# Patient Record
Sex: FEMALE | Race: ASIAN | NOT HISPANIC OR LATINO | URBAN - METROPOLITAN AREA
[De-identification: names, ages, dates, MRNs, and addresses within clinical notes are randomized per-mention and may not be internally consistent; named-entity substitution may affect disease eponyms.]

---

## 2024-01-04 ENCOUNTER — OUTPATIENT (OUTPATIENT)
Dept: OUTPATIENT SERVICES | Facility: HOSPITAL | Age: 39
LOS: 1 days | End: 2024-01-04
Payer: MEDICAID

## 2024-01-04 VITALS
HEART RATE: 79 BPM | SYSTOLIC BLOOD PRESSURE: 107 MMHG | DIASTOLIC BLOOD PRESSURE: 67 MMHG | OXYGEN SATURATION: 99 % | HEIGHT: 62 IN | WEIGHT: 145.51 LBS | RESPIRATION RATE: 16 BRPM | TEMPERATURE: 98 F

## 2024-01-04 DIAGNOSIS — K75.81 NONALCOHOLIC STEATOHEPATITIS (NASH): ICD-10-CM

## 2024-01-04 DIAGNOSIS — Z01.818 ENCOUNTER FOR OTHER PREPROCEDURAL EXAMINATION: ICD-10-CM

## 2024-01-04 LAB
ALBUMIN SERPL ELPH-MCNC: 4.2 G/DL — SIGNIFICANT CHANGE UP (ref 3.5–5.2)
ALBUMIN SERPL ELPH-MCNC: 4.2 G/DL — SIGNIFICANT CHANGE UP (ref 3.5–5.2)
ALP SERPL-CCNC: 52 U/L — SIGNIFICANT CHANGE UP (ref 30–115)
ALP SERPL-CCNC: 52 U/L — SIGNIFICANT CHANGE UP (ref 30–115)
ALT FLD-CCNC: 22 U/L — SIGNIFICANT CHANGE UP (ref 0–41)
ALT FLD-CCNC: 22 U/L — SIGNIFICANT CHANGE UP (ref 0–41)
ANION GAP SERPL CALC-SCNC: 12 MMOL/L — SIGNIFICANT CHANGE UP (ref 7–14)
ANION GAP SERPL CALC-SCNC: 12 MMOL/L — SIGNIFICANT CHANGE UP (ref 7–14)
APTT BLD: 30.9 SEC — SIGNIFICANT CHANGE UP (ref 27–39.2)
APTT BLD: 30.9 SEC — SIGNIFICANT CHANGE UP (ref 27–39.2)
AST SERPL-CCNC: 18 U/L — SIGNIFICANT CHANGE UP (ref 0–41)
AST SERPL-CCNC: 18 U/L — SIGNIFICANT CHANGE UP (ref 0–41)
BASOPHILS # BLD AUTO: 0.03 K/UL — SIGNIFICANT CHANGE UP (ref 0–0.2)
BASOPHILS # BLD AUTO: 0.03 K/UL — SIGNIFICANT CHANGE UP (ref 0–0.2)
BASOPHILS NFR BLD AUTO: 0.9 % — SIGNIFICANT CHANGE UP (ref 0–1)
BASOPHILS NFR BLD AUTO: 0.9 % — SIGNIFICANT CHANGE UP (ref 0–1)
BILIRUB SERPL-MCNC: 0.2 MG/DL — SIGNIFICANT CHANGE UP (ref 0.2–1.2)
BILIRUB SERPL-MCNC: 0.2 MG/DL — SIGNIFICANT CHANGE UP (ref 0.2–1.2)
BUN SERPL-MCNC: 8 MG/DL — LOW (ref 10–20)
BUN SERPL-MCNC: 8 MG/DL — LOW (ref 10–20)
CALCIUM SERPL-MCNC: 9.3 MG/DL — SIGNIFICANT CHANGE UP (ref 8.4–10.5)
CALCIUM SERPL-MCNC: 9.3 MG/DL — SIGNIFICANT CHANGE UP (ref 8.4–10.5)
CHLORIDE SERPL-SCNC: 102 MMOL/L — SIGNIFICANT CHANGE UP (ref 98–110)
CHLORIDE SERPL-SCNC: 102 MMOL/L — SIGNIFICANT CHANGE UP (ref 98–110)
CO2 SERPL-SCNC: 22 MMOL/L — SIGNIFICANT CHANGE UP (ref 17–32)
CO2 SERPL-SCNC: 22 MMOL/L — SIGNIFICANT CHANGE UP (ref 17–32)
CREAT SERPL-MCNC: 0.6 MG/DL — LOW (ref 0.7–1.5)
CREAT SERPL-MCNC: 0.6 MG/DL — LOW (ref 0.7–1.5)
EGFR: 116 ML/MIN/1.73M2 — SIGNIFICANT CHANGE UP
EGFR: 116 ML/MIN/1.73M2 — SIGNIFICANT CHANGE UP
EOSINOPHIL # BLD AUTO: 0.11 K/UL — SIGNIFICANT CHANGE UP (ref 0–0.7)
EOSINOPHIL # BLD AUTO: 0.11 K/UL — SIGNIFICANT CHANGE UP (ref 0–0.7)
EOSINOPHIL NFR BLD AUTO: 3.2 % — SIGNIFICANT CHANGE UP (ref 0–8)
EOSINOPHIL NFR BLD AUTO: 3.2 % — SIGNIFICANT CHANGE UP (ref 0–8)
GLUCOSE SERPL-MCNC: 120 MG/DL — HIGH (ref 70–99)
GLUCOSE SERPL-MCNC: 120 MG/DL — HIGH (ref 70–99)
HCG SERPL-ACNC: <1 MIU/ML — SIGNIFICANT CHANGE UP
HCG SERPL-ACNC: <1 MIU/ML — SIGNIFICANT CHANGE UP
HCT VFR BLD CALC: 36.1 % — LOW (ref 37–47)
HCT VFR BLD CALC: 36.1 % — LOW (ref 37–47)
HGB BLD-MCNC: 11.7 G/DL — LOW (ref 12–16)
HGB BLD-MCNC: 11.7 G/DL — LOW (ref 12–16)
IMM GRANULOCYTES NFR BLD AUTO: 0.3 % — SIGNIFICANT CHANGE UP (ref 0.1–0.3)
IMM GRANULOCYTES NFR BLD AUTO: 0.3 % — SIGNIFICANT CHANGE UP (ref 0.1–0.3)
INR BLD: 1.06 RATIO — SIGNIFICANT CHANGE UP (ref 0.65–1.3)
INR BLD: 1.06 RATIO — SIGNIFICANT CHANGE UP (ref 0.65–1.3)
LYMPHOCYTES # BLD AUTO: 1.43 K/UL — SIGNIFICANT CHANGE UP (ref 1.2–3.4)
LYMPHOCYTES # BLD AUTO: 1.43 K/UL — SIGNIFICANT CHANGE UP (ref 1.2–3.4)
LYMPHOCYTES # BLD AUTO: 41.3 % — SIGNIFICANT CHANGE UP (ref 20.5–51.1)
LYMPHOCYTES # BLD AUTO: 41.3 % — SIGNIFICANT CHANGE UP (ref 20.5–51.1)
MCHC RBC-ENTMCNC: 27.7 PG — SIGNIFICANT CHANGE UP (ref 27–31)
MCHC RBC-ENTMCNC: 27.7 PG — SIGNIFICANT CHANGE UP (ref 27–31)
MCHC RBC-ENTMCNC: 32.4 G/DL — SIGNIFICANT CHANGE UP (ref 32–37)
MCHC RBC-ENTMCNC: 32.4 G/DL — SIGNIFICANT CHANGE UP (ref 32–37)
MCV RBC AUTO: 85.5 FL — SIGNIFICANT CHANGE UP (ref 81–99)
MCV RBC AUTO: 85.5 FL — SIGNIFICANT CHANGE UP (ref 81–99)
MONOCYTES # BLD AUTO: 0.26 K/UL — SIGNIFICANT CHANGE UP (ref 0.1–0.6)
MONOCYTES # BLD AUTO: 0.26 K/UL — SIGNIFICANT CHANGE UP (ref 0.1–0.6)
MONOCYTES NFR BLD AUTO: 7.5 % — SIGNIFICANT CHANGE UP (ref 1.7–9.3)
MONOCYTES NFR BLD AUTO: 7.5 % — SIGNIFICANT CHANGE UP (ref 1.7–9.3)
NEUTROPHILS # BLD AUTO: 1.62 K/UL — SIGNIFICANT CHANGE UP (ref 1.4–6.5)
NEUTROPHILS # BLD AUTO: 1.62 K/UL — SIGNIFICANT CHANGE UP (ref 1.4–6.5)
NEUTROPHILS NFR BLD AUTO: 46.8 % — SIGNIFICANT CHANGE UP (ref 42.2–75.2)
NEUTROPHILS NFR BLD AUTO: 46.8 % — SIGNIFICANT CHANGE UP (ref 42.2–75.2)
NRBC # BLD: 0 /100 WBCS — SIGNIFICANT CHANGE UP (ref 0–0)
NRBC # BLD: 0 /100 WBCS — SIGNIFICANT CHANGE UP (ref 0–0)
PLATELET # BLD AUTO: 208 K/UL — SIGNIFICANT CHANGE UP (ref 130–400)
PLATELET # BLD AUTO: 208 K/UL — SIGNIFICANT CHANGE UP (ref 130–400)
PMV BLD: 10.5 FL — HIGH (ref 7.4–10.4)
PMV BLD: 10.5 FL — HIGH (ref 7.4–10.4)
POTASSIUM SERPL-MCNC: 4.4 MMOL/L — SIGNIFICANT CHANGE UP (ref 3.5–5)
POTASSIUM SERPL-MCNC: 4.4 MMOL/L — SIGNIFICANT CHANGE UP (ref 3.5–5)
POTASSIUM SERPL-SCNC: 4.4 MMOL/L — SIGNIFICANT CHANGE UP (ref 3.5–5)
POTASSIUM SERPL-SCNC: 4.4 MMOL/L — SIGNIFICANT CHANGE UP (ref 3.5–5)
PROT SERPL-MCNC: 7.3 G/DL — SIGNIFICANT CHANGE UP (ref 6–8)
PROT SERPL-MCNC: 7.3 G/DL — SIGNIFICANT CHANGE UP (ref 6–8)
PROTHROM AB SERPL-ACNC: 12.1 SEC — SIGNIFICANT CHANGE UP (ref 9.95–12.87)
PROTHROM AB SERPL-ACNC: 12.1 SEC — SIGNIFICANT CHANGE UP (ref 9.95–12.87)
RBC # BLD: 4.22 M/UL — SIGNIFICANT CHANGE UP (ref 4.2–5.4)
RBC # BLD: 4.22 M/UL — SIGNIFICANT CHANGE UP (ref 4.2–5.4)
RBC # FLD: 12.5 % — SIGNIFICANT CHANGE UP (ref 11.5–14.5)
RBC # FLD: 12.5 % — SIGNIFICANT CHANGE UP (ref 11.5–14.5)
SODIUM SERPL-SCNC: 136 MMOL/L — SIGNIFICANT CHANGE UP (ref 135–146)
SODIUM SERPL-SCNC: 136 MMOL/L — SIGNIFICANT CHANGE UP (ref 135–146)
WBC # BLD: 3.46 K/UL — LOW (ref 4.8–10.8)
WBC # BLD: 3.46 K/UL — LOW (ref 4.8–10.8)
WBC # FLD AUTO: 3.46 K/UL — LOW (ref 4.8–10.8)
WBC # FLD AUTO: 3.46 K/UL — LOW (ref 4.8–10.8)

## 2024-01-04 PROCEDURE — 36415 COLL VENOUS BLD VENIPUNCTURE: CPT

## 2024-01-04 PROCEDURE — 93005 ELECTROCARDIOGRAM TRACING: CPT

## 2024-01-04 PROCEDURE — 85025 COMPLETE CBC W/AUTO DIFF WBC: CPT

## 2024-01-04 PROCEDURE — 85730 THROMBOPLASTIN TIME PARTIAL: CPT

## 2024-01-04 PROCEDURE — 85610 PROTHROMBIN TIME: CPT

## 2024-01-04 PROCEDURE — 93010 ELECTROCARDIOGRAM REPORT: CPT

## 2024-01-04 PROCEDURE — 99214 OFFICE O/P EST MOD 30 MIN: CPT | Mod: 25

## 2024-01-04 PROCEDURE — 84702 CHORIONIC GONADOTROPIN TEST: CPT

## 2024-01-04 PROCEDURE — 80053 COMPREHEN METABOLIC PANEL: CPT

## 2024-01-04 NOTE — H&P PST ADULT - LIVES WITH, PROFILE
ADD (attention deficit disorder)    Bipolar depression    Difficulty sleeping    Displaced fracture of proximal phalanx of left little finger    Migraine    
children/spouse

## 2024-01-04 NOTE — H&P PST ADULT - NSANTHOSAYNRD_GEN_A_CORE
No. MARIO ALBERTO screening performed.  STOP BANG Legend: 0-2 = LOW Risk; 3-4 = INTERMEDIATE Risk; 5-8 = HIGH Risk

## 2024-01-04 NOTE — H&P PST ADULT - REASON FOR ADMISSION
39 y/o female presents to PAST in preparation for liver bx(level 2/high bleeding) in IR with Dr Melvin on 1/10/24

## 2024-01-04 NOTE — H&P PST ADULT - HISTORY OF PRESENT ILLNESS
37 y/o female presents to PAST in preparation for liver bx(level 2/high bleeding) in IR with Dr Melvin on 1/10/24    Pt presents with her . Pt states that she has had back pain and non-specific pain for the past few months. She has had elevated LFT's and now for above procedure for further dx.   PATIENT CURRENTLY DENIES CHEST PAIN  SHORTNESS OF BREATH  PALPITATIONS,  DYSURIA, OR UPPER RESPIRATORY INFECTION IN PAST 2 WEEKS  Patient verbalized understanding of instructions and was given the opportunity to ask questions and have them answered.  As per patient, this is their complete medical and surgical history, including medications both prescribed or over the counter.  written and verbal instructions with teach back on chlorhexidine shampoo provided,  pt verbalized understanding with returned demonstration    Anesthesia Alert  NO--Difficult Airway  NO--History of neck surgery or radiation  NO--Limited ROM of neck  NO--History of Malignant hyperthermia  NO--Personal or family history of Pseudocholinesterase deficiency.  NO--Prior Anesthesia Complication  NO--Latex Allergy  NO--Loose teeth  NO--History of Rheumatoid Arthritis  NO--MARIO ALBERTO  NO--Bleeding risk  NO--Other_____  Mallampati airway: Class III    [facility] Activity Status Index (DASI)  1/4/2024      RESULT SUMMARY:  58.2 points  The higher the score (maximum 58.2), the higher the functional status.    9.89 METs        INPUTS:  Take care of self —> 2.75 = Yes  Walk indoors —> 1.75 = Yes  Walk 1&ndash;2 blocks on level ground —> 2.75 = Yes  Climb a flight of stairs or walk up a hill —> 5.5 = Yes  Run a short distance —> 8 = Yes  Do light work around the house —> 2.7 = Yes  Do moderate work around the house —> 3.5 = Yes  Do heavy work around the house —> 8 = Yes  Do yardwork —> 4.5 = Yes  Have sexual relations —> 5.25 = Yes  Participate in moderate recreational activities —> 6 = Yes  Participate in strenuous sports —> 7.5 = Yes      Revised Cardiac Risk Index for Pre-Operative Risk 1/4/2024      RESULT SUMMARY:  0 points  Class I Risk    3.9 %  30-day risk of death, MI, or cardiac arrest    INPUTS:  Elevated-risk surgery —> 0 = No  History of ischemic heart disease —> 0 = No  History of congestive heart failure —> 0 = No  History of cerebrovascular disease —> 0 = No  Pre-operative treatment with insulin —> 0 = No  Pre-operative creatinine >2 mg/dL / 176.8 µmol/L —> 0 = No      Nonalcoholic steatohepatitis (GUAJARDO)    Encounter for other preprocedural examination    SysAdmin_VstLnk

## 2024-01-05 DIAGNOSIS — Z01.818 ENCOUNTER FOR OTHER PREPROCEDURAL EXAMINATION: ICD-10-CM

## 2024-01-05 DIAGNOSIS — K75.81 NONALCOHOLIC STEATOHEPATITIS (NASH): ICD-10-CM

## 2024-01-10 ENCOUNTER — OUTPATIENT (OUTPATIENT)
Dept: OUTPATIENT SERVICES | Facility: HOSPITAL | Age: 39
LOS: 1 days | Discharge: HOME | End: 2024-01-10
Payer: MEDICAID

## 2024-01-10 ENCOUNTER — TRANSCRIPTION ENCOUNTER (OUTPATIENT)
Age: 39
End: 2024-01-10

## 2024-01-10 ENCOUNTER — RESULT REVIEW (OUTPATIENT)
Age: 39
End: 2024-01-10

## 2024-01-10 VITALS
DIASTOLIC BLOOD PRESSURE: 60 MMHG | SYSTOLIC BLOOD PRESSURE: 103 MMHG | OXYGEN SATURATION: 99 % | HEART RATE: 67 BPM | RESPIRATION RATE: 18 BRPM | TEMPERATURE: 97 F

## 2024-01-10 VITALS
RESPIRATION RATE: 18 BRPM | OXYGEN SATURATION: 99 % | SYSTOLIC BLOOD PRESSURE: 98 MMHG | DIASTOLIC BLOOD PRESSURE: 60 MMHG | HEART RATE: 66 BPM

## 2024-01-10 DIAGNOSIS — K75.81 NONALCOHOLIC STEATOHEPATITIS (NASH): ICD-10-CM

## 2024-01-10 PROCEDURE — 76942 ECHO GUIDE FOR BIOPSY: CPT | Mod: 26

## 2024-01-10 PROCEDURE — 88307 TISSUE EXAM BY PATHOLOGIST: CPT

## 2024-01-10 PROCEDURE — 88312 SPECIAL STAINS GROUP 1: CPT | Mod: 26

## 2024-01-10 PROCEDURE — 99152 MOD SED SAME PHYS/QHP 5/>YRS: CPT

## 2024-01-10 PROCEDURE — 47000 NEEDLE BIOPSY OF LIVER PERQ: CPT

## 2024-01-10 PROCEDURE — 76942 ECHO GUIDE FOR BIOPSY: CPT

## 2024-01-10 PROCEDURE — 88313 SPECIAL STAINS GROUP 2: CPT | Mod: 26

## 2024-01-10 PROCEDURE — 88312 SPECIAL STAINS GROUP 1: CPT

## 2024-01-10 PROCEDURE — 88313 SPECIAL STAINS GROUP 2: CPT

## 2024-01-10 PROCEDURE — 88307 TISSUE EXAM BY PATHOLOGIST: CPT | Mod: 26

## 2024-01-10 PROCEDURE — 99153 MOD SED SAME PHYS/QHP EA: CPT

## 2024-01-10 RX ORDER — CHOLECALCIFEROL (VITAMIN D3) 125 MCG
1 CAPSULE ORAL
Refills: 0 | DISCHARGE

## 2024-01-10 NOTE — ASU PATIENT PROFILE, ADULT - NSICDXPASTMEDICALHX_GEN_ALL_CORE_FT
PAST MEDICAL HISTORY:  Hyperlipemia     Nonalcoholic steatohepatitis (GUAJARDO)      PAST MEDICAL HISTORY:  Hyperlipemia     Nonalcoholic steatohepatitis (GUAJADRO)

## 2024-01-10 NOTE — ASU DISCHARGE PLAN (ADULT/PEDIATRIC) - NS MD DC FALL RISK RISK
For information on Fall & Injury Prevention, visit: [email or web address] OR  [email or web address] OR  https://www.cdc.gov/steadi/patient.html For information on Fall & Injury Prevention, visit: [email or web address] OR  [email or web address] OR  https://www.cdc.gov/steadi/patient.html

## 2024-01-10 NOTE — PROGRESS NOTE ADULT - SUBJECTIVE AND OBJECTIVE BOX
PREOPERATIVE DAY OF PROCEDURE EVALUATION:     I have personally seen and examined this patient. I agree with the history and physical which I have reviewed and noted any changes below:     Plan is for image guided non target liver biopsy with conscious sedation / anesthesia today 1/10  H+P from PST 1/4    Procedure/ risks/ benefits/ goals/ alternatives were explained. All questions answered. Informed content obtained from patient. Consent placed in chart.

## 2024-01-10 NOTE — ASU PATIENT PROFILE, ADULT - FALL HARM RISK - UNIVERSAL INTERVENTIONS
Bed in lowest position, wheels locked, appropriate side rails in place/Call bell, personal items and telephone in reach/Instruct patient to call for assistance before getting out of bed or chair/Non-slip footwear when patient is out of bed/[place] to call system/Physically safe environment - no spills, clutter or unnecessary equipment/Purposeful Proactive Rounding/Room/bathroom lighting operational, light cord in reach Bed in lowest position, wheels locked, appropriate side rails in place/Call bell, personal items and telephone in reach/Instruct patient to call for assistance before getting out of bed or chair/Non-slip footwear when patient is out of bed/[place] to call system/Physically safe environment - no spills, clutter or unnecessary equipment/Purposeful Proactive Rounding/Room/bathroom lighting operational, light cord in reach

## 2024-01-10 NOTE — PROGRESS NOTE ADULT - SUBJECTIVE AND OBJECTIVE BOX
INTERVENTIONAL RADIOLOGY BRIEF-OPERATIVE NOTE    Procedure: liver parenchyma biopsy    Pre-Op Diagnosis: abnormal LFT's    Post-Op Diagnosis: same as pre    Attending: Leonarda  Resident:     Anesthesia (type):  [ ] General Anesthesia  [ ] Deep Sedation - provided by anesthesiologist  [ x] Conscious Sedation -  Versed and Fentanyl (30min)  [ x] Local/Regional    Contrast: 0    Estimated Blood Loss: <5cc    Condition:   [ ] Critical  [ ] Serious  [ ] Fair   [ x] Good    Findings/Follow up Plan of Care: right hepatic lobe 18G core needle biopsy x 2    Specimens Removed: see above    Implants: none    Complications: no immediate    Disposition: recovery x 4 hours then home.       Please call Interventional Radiology [phone] with any questions, concerns, or issues.         INTERVENTIONAL RADIOLOGY BRIEF-OPERATIVE NOTE    Procedure: liver parenchyma biopsy    Pre-Op Diagnosis: abnormal LFT's    Post-Op Diagnosis: same as pre    Attending: Leonarda  Resident:     Anesthesia (type):  [ ] General Anesthesia  [ ] Deep Sedation - provided by anesthesiologist  [ x] Conscious Sedation -  Versed and Fentanyl (30min)  [ x] Local/Regional    Contrast: 0    Estimated Blood Loss: <5cc    Condition:   [ ] Critical  [ ] Serious  [ ] Fair   [ x] Good    Findings/Follow up Plan of Care: right hepatic lobe 18G core needle biopsy x 2    Specimens Removed: see above    Implants: none    Complications: no immediate    Disposition: recovery x 4 hours then home.       Please call Interventional Radiology [phone] with any questions, concerns, or issues.

## 2024-01-12 LAB
SURGICAL PATHOLOGY STUDY: SIGNIFICANT CHANGE UP
SURGICAL PATHOLOGY STUDY: SIGNIFICANT CHANGE UP

## 2024-01-16 DIAGNOSIS — R79.89 OTHER SPECIFIED ABNORMAL FINDINGS OF BLOOD CHEMISTRY: ICD-10-CM

## 2024-02-12 PROBLEM — K75.81 NONALCOHOLIC STEATOHEPATITIS (NASH): Chronic | Status: ACTIVE | Noted: 2024-01-04

## 2024-02-12 PROBLEM — E78.5 HYPERLIPIDEMIA, UNSPECIFIED: Chronic | Status: ACTIVE | Noted: 2024-01-04

## 2024-04-25 ENCOUNTER — APPOINTMENT (OUTPATIENT)
Dept: PULMONOLOGY | Facility: CLINIC | Age: 39
End: 2024-04-25
Payer: MEDICAID

## 2024-04-25 ENCOUNTER — OUTPATIENT (OUTPATIENT)
Dept: OUTPATIENT SERVICES | Facility: HOSPITAL | Age: 39
LOS: 1 days | End: 2024-04-25
Payer: MEDICAID

## 2024-04-25 VITALS
WEIGHT: 140 LBS | DIASTOLIC BLOOD PRESSURE: 74 MMHG | OXYGEN SATURATION: 99 % | BODY MASS INDEX: 24.8 KG/M2 | SYSTOLIC BLOOD PRESSURE: 110 MMHG | HEIGHT: 63 IN | HEART RATE: 75 BPM

## 2024-04-25 DIAGNOSIS — Z83.438 FAMILY HISTORY OF OTHER DISORDER OF LIPOPROTEIN METABOLISM AND OTHER LIPIDEMIA: ICD-10-CM

## 2024-04-25 DIAGNOSIS — Z86.39 PERSONAL HISTORY OF OTHER ENDOCRINE, NUTRITIONAL AND METABOLIC DISEASE: ICD-10-CM

## 2024-04-25 DIAGNOSIS — R06.02 SHORTNESS OF BREATH: ICD-10-CM

## 2024-04-25 DIAGNOSIS — G47.33 OBSTRUCTIVE SLEEP APNEA (ADULT) (PEDIATRIC): ICD-10-CM

## 2024-04-25 PROBLEM — Z00.00 ENCOUNTER FOR PREVENTIVE HEALTH EXAMINATION: Status: ACTIVE | Noted: 2024-04-25

## 2024-04-25 PROCEDURE — 71046 X-RAY EXAM CHEST 2 VIEWS: CPT

## 2024-04-25 PROCEDURE — 71046 X-RAY EXAM CHEST 2 VIEWS: CPT | Mod: 26

## 2024-04-25 PROCEDURE — 99204 OFFICE O/P NEW MOD 45 MIN: CPT

## 2024-04-25 NOTE — PLAN
[TextEntry] : -SOB  cxr ordered  PFT ordered  cardiology eval  -alicja will proceed with sleep study  counseled for weight reduction  told not to drive if feel tired  counseled for sleep hygiene

## 2024-04-25 NOTE — REASON FOR VISIT
[Initial] : an initial visit [Sleep Apnea] : sleep apnea [Shortness of Breath] : shortness of breath [TextEntry] : This is a 39-year-old female referred by her primary physician for evaluation for sleep apnea and shortness of breath.  Patient non-smoker she reported that sometimes she feels shortness of breath on exertion she feels that cannot take deep breaths no cough no wheezing no runny nose no postnasal drip Also she is complaining of all the lower symptoms sleep apnea such as worsening daytime fatigue somnolence snoring at night wake up sometimes gasping choking twist and turn while sleeping irritability PMD note reviewed

## 2024-04-25 NOTE — HISTORY OF PRESENT ILLNESS
[Initial Evaluation] : an initial evaluation of [Excessive Daytime Sleepiness] : excessive daytime sleepiness [Witnessed Apnea During Sleep] : witnessed apnea during sleep [Snoring] : snoring [Unrefreshing Sleep] : unrefreshing sleep [Sleepy When Sedentary] : sleepy when sedentary [Irritability] : irritability [Currently Experiencing] : The patient is currently experiencing symptoms. [Shortness of Breath] : shortness of breath

## 2024-04-26 DIAGNOSIS — R06.02 SHORTNESS OF BREATH: ICD-10-CM

## 2024-05-02 ENCOUNTER — APPOINTMENT (OUTPATIENT)
Dept: SLEEP CENTER | Facility: HOSPITAL | Age: 39
End: 2024-05-02

## 2024-06-20 ENCOUNTER — APPOINTMENT (OUTPATIENT)
Dept: SLEEP CENTER | Facility: HOSPITAL | Age: 39
End: 2024-06-20
Payer: MEDICAID

## 2024-06-20 ENCOUNTER — OUTPATIENT (OUTPATIENT)
Dept: OUTPATIENT SERVICES | Facility: HOSPITAL | Age: 39
LOS: 1 days | Discharge: HOME | End: 2024-06-20
Payer: MEDICAID

## 2024-06-20 DIAGNOSIS — G47.33 OBSTRUCTIVE SLEEP APNEA (ADULT) (PEDIATRIC): ICD-10-CM

## 2024-06-20 PROCEDURE — 95800 SLP STDY UNATTENDED: CPT | Mod: 26

## 2024-06-20 PROCEDURE — 95800 SLP STDY UNATTENDED: CPT

## 2024-06-25 DIAGNOSIS — G47.33 OBSTRUCTIVE SLEEP APNEA (ADULT) (PEDIATRIC): ICD-10-CM

## 2024-07-31 ENCOUNTER — APPOINTMENT (OUTPATIENT)
Dept: PULMONOLOGY | Facility: CLINIC | Age: 39
End: 2024-07-31
Payer: MEDICAID

## 2024-07-31 VITALS
OXYGEN SATURATION: 98 % | HEIGHT: 63 IN | DIASTOLIC BLOOD PRESSURE: 66 MMHG | HEART RATE: 92 BPM | WEIGHT: 140 LBS | BODY MASS INDEX: 24.8 KG/M2 | SYSTOLIC BLOOD PRESSURE: 108 MMHG

## 2024-07-31 PROCEDURE — 99214 OFFICE O/P EST MOD 30 MIN: CPT

## 2024-07-31 PROCEDURE — G2211 COMPLEX E/M VISIT ADD ON: CPT | Mod: NC,1L

## 2024-07-31 NOTE — HISTORY OF PRESENT ILLNESS
[TextBox_4] : Patient came today with her daughter for follow-up she is Persian speaking she status post sleep study result discussed with the patient daughter told that she has mild sleep apnea AHI 10.5 patient do have symptom of daytime somnolence and fatigue interrupted sleep at night offered her APAP machine and she approved. Chest x-ray reviewed was normal, she did not do the PFT yet did not see cardiology She still sometimes not always have shortness of breath when she go upstairs and on exertion no cough no wheezing

## 2024-07-31 NOTE — PLAN
[TextEntry] : Apap machine ordered  patient was counseled to use the machine every night at least 4 hrs  also counseled for weight loss and exercise PFT reordered for work up for sob  referred to be seen by cardiology

## 2024-08-08 ENCOUNTER — APPOINTMENT (OUTPATIENT)
Dept: CARDIOLOGY | Facility: CLINIC | Age: 39
End: 2024-08-08

## 2024-08-08 PROBLEM — R01.1 CARDIAC MURMUR: Status: ACTIVE | Noted: 2024-08-08

## 2024-08-08 PROBLEM — D64.9 ANEMIA, UNSPECIFIED TYPE: Status: ACTIVE | Noted: 2024-08-08

## 2024-08-08 PROBLEM — E88.810 METABOLIC SYNDROME: Status: ACTIVE | Noted: 2024-08-08

## 2024-08-08 PROBLEM — Z82.49 FAMILY HISTORY OF ACUTE MYOCARDIAL INFARCTION: Status: ACTIVE | Noted: 2024-08-08

## 2024-08-08 PROBLEM — E78.2 MIXED HYPERLIPIDEMIA: Status: ACTIVE | Noted: 2024-08-08

## 2024-08-08 PROBLEM — I95.1 ORTHOSTATIC INTOLERANCE: Status: ACTIVE | Noted: 2024-08-08

## 2024-08-08 PROCEDURE — 99204 OFFICE O/P NEW MOD 45 MIN: CPT

## 2024-08-08 NOTE — DISCUSSION/SUMMARY
[FreeTextEntry1] : pt refused EKG  get 2 d echo  pt says not that sob now f/u pulmonary  increase water to 8 bottles/day  f/u in 1 month  get exercise stress nuclear

## 2024-08-08 NOTE — HISTORY OF PRESENT ILLNESS
[FreeTextEntry1] : Pt with HLD, metabolic syndrome, positive MARCIAL , MARIO ALBERTO on cpap, Anemia, Hypotension, polyarthritis sent for cv evaluation for sob. father with MI at 50s and uncles   appreciate dr. trinh note, pt with MARIO ALBERTO now cpap for PFTs and cv evaluation. pt had elevated MARCIAL saw dr. baez and felt not CTD.  : 11.7/32.1 h/h, : A1c: 5.5, T , HDL 32  pt bp 100/70 sitting to 80/40 standing.  pt says was not dizzy with bp of 80. pt drinks 2 glasses of water a day. pt was sent or evaluation of sob.  pt or rezdiffra for GUAJARDO.  pt complains of sob with walking and feels tired after an hour of walking. pt sob is not severe.  EKG: nsr normal ecg

## 2024-08-08 NOTE — PHYSICAL EXAM
[Normal Venous Pressure] : normal venous pressure [Normal S1, S2] : normal S1, S2 [Clear Lung Fields] : clear lung fields [Soft] : abdomen soft [No Edema] : no edema [Murmur] : murmur [de-identified] : ORA [de-identified] : rrr

## 2024-08-26 ENCOUNTER — APPOINTMENT (OUTPATIENT)
Dept: HEPATOLOGY | Facility: CLINIC | Age: 39
End: 2024-08-26

## 2024-08-27 ENCOUNTER — APPOINTMENT (OUTPATIENT)
Dept: CARDIOLOGY | Facility: CLINIC | Age: 39
End: 2024-08-27

## 2024-09-09 ENCOUNTER — APPOINTMENT (OUTPATIENT)
Dept: CARDIOLOGY | Facility: CLINIC | Age: 39
End: 2024-09-09

## 2024-09-09 NOTE — DISCUSSION/SUMMARY
[FreeTextEntry1] : pt refused EKG  get 2 d echo - ECHO DENIED BY INSURANCE.  pt says not that sob now f/u pulmonary  increase water to 8 bottles/day  f/u in 1 month  get exercise stress nuclear

## 2024-09-09 NOTE — PHYSICAL EXAM
[Normal Venous Pressure] : normal venous pressure [Normal S1, S2] : normal S1, S2 [Murmur] : murmur [Clear Lung Fields] : clear lung fields [Soft] : abdomen soft [No Edema] : no edema [de-identified] : ORA [de-identified] : rrr

## 2024-09-09 NOTE — PHYSICAL EXAM
[Normal Venous Pressure] : normal venous pressure [Normal S1, S2] : normal S1, S2 [Murmur] : murmur [Clear Lung Fields] : clear lung fields [Soft] : abdomen soft [No Edema] : no edema [de-identified] : ORA [de-identified] : rrr

## 2024-09-09 NOTE — HISTORY OF PRESENT ILLNESS
[FreeTextEntry1] : Pt with HLD, metabolic syndrome, positive MARCIAL , MARIO ALBERTO on cpap, Anemia, Hypotension, polyarthritis sent for cv evaluation for sob. father with MI at 50s and uncles   appreciate dr. trinh note, pt with MARIO ALBERTO now cpap for PFTs and cv evaluation. pt had elevated MARCIAL saw dr. baez and felt not CTD.  : 11.7/32.1 h/h, : A1c: 5.5, T , HDL 32  pt bp 100/70 sitting to 80/40 standing.  pt says was not dizzy with bp of 80. pt drinks 2 glasses of water a day. pt was sent or evaluation of sob.  pt or rezdiffra for GUAJARDO.  pt complains of sob with walking and feels tired after an hour of walking. pt sob is not severe.  EKG: nsr normal ecg   24: Echo denied  nuclear denied?  [Follow-Up: _____] : a [unfilled] follow-up visit  [Mother] : mother

## 2024-09-09 NOTE — HISTORY OF PRESENT ILLNESS
[FreeTextEntry1] : Pt with HLD, metabolic syndrome, positive MARCIAL , MARIO ALBERTO on cpap, Anemia, Hypotension, polyarthritis sent for cv evaluation for sob. father with MI at 50s and uncles   appreciate dr. trinh note, pt with MARIO ALBERTO now cpap for PFTs and cv evaluation. pt had elevated MARCIAL saw dr. baez and felt not CTD.  : 11.7/32.1 h/h, : A1c: 5.5, T , HDL 32  pt bp 100/70 sitting to 80/40 standing.  pt says was not dizzy with bp of 80. pt drinks 2 glasses of water a day. pt was sent or evaluation of sob.  pt or rezdiffra for GUAJARDO.  pt complains of sob with walking and feels tired after an hour of walking. pt sob is not severe.  EKG: nsr normal ecg   24: Echo denied  nuclear denied?

## 2024-10-25 ENCOUNTER — APPOINTMENT (OUTPATIENT)
Dept: PULMONOLOGY | Facility: CLINIC | Age: 39
End: 2024-10-25

## 2025-04-08 ENCOUNTER — APPOINTMENT (OUTPATIENT)
Dept: GASTROENTEROLOGY | Facility: CLINIC | Age: 40
End: 2025-04-08